# Patient Record
Sex: MALE | Race: WHITE | NOT HISPANIC OR LATINO | Employment: OTHER | URBAN - METROPOLITAN AREA
[De-identification: names, ages, dates, MRNs, and addresses within clinical notes are randomized per-mention and may not be internally consistent; named-entity substitution may affect disease eponyms.]

---

## 2017-03-09 ENCOUNTER — OFFICE VISIT (OUTPATIENT)
Dept: CARDIOLOGY | Facility: MEDICAL CENTER | Age: 82
End: 2017-03-09
Payer: MEDICARE

## 2017-03-09 ENCOUNTER — TELEPHONE (OUTPATIENT)
Dept: CARDIOLOGY | Facility: MEDICAL CENTER | Age: 82
End: 2017-03-09

## 2017-03-09 VITALS
BODY MASS INDEX: 26.21 KG/M2 | HEIGHT: 67 IN | WEIGHT: 167 LBS | SYSTOLIC BLOOD PRESSURE: 110 MMHG | HEART RATE: 74 BPM | OXYGEN SATURATION: 90 % | DIASTOLIC BLOOD PRESSURE: 70 MMHG

## 2017-03-09 DIAGNOSIS — I49.5 SICK SINUS SYNDROME (HCC): ICD-10-CM

## 2017-03-09 DIAGNOSIS — Z95.0 CARDIAC PACEMAKER IN SITU: ICD-10-CM

## 2017-03-09 DIAGNOSIS — S06.5XAA SUBDURAL HEMATOMA (HCC): ICD-10-CM

## 2017-03-09 PROCEDURE — 99214 OFFICE O/P EST MOD 30 MIN: CPT | Performed by: INTERNAL MEDICINE

## 2017-03-09 PROCEDURE — G8432 DEP SCR NOT DOC, RNG: HCPCS | Performed by: INTERNAL MEDICINE

## 2017-03-09 PROCEDURE — 1036F TOBACCO NON-USER: CPT | Performed by: INTERNAL MEDICINE

## 2017-03-09 PROCEDURE — 4040F PNEUMOC VAC/ADMIN/RCVD: CPT | Mod: 8P | Performed by: INTERNAL MEDICINE

## 2017-03-09 PROCEDURE — G8420 CALC BMI NORM PARAMETERS: HCPCS | Performed by: INTERNAL MEDICINE

## 2017-03-09 PROCEDURE — G8484 FLU IMMUNIZE NO ADMIN: HCPCS | Performed by: INTERNAL MEDICINE

## 2017-03-09 PROCEDURE — 1101F PT FALLS ASSESS-DOCD LE1/YR: CPT | Mod: 8P | Performed by: INTERNAL MEDICINE

## 2017-03-09 RX ORDER — FLUTICASONE PROPIONATE 50 MCG
1 SPRAY, SUSPENSION (ML) NASAL DAILY
COMMUNITY
End: 2019-01-01

## 2017-03-09 NOTE — MR AVS SNAPSHOT
"Gonzales De La Cruz   3/9/2017 2:45 PM   Office Visit   MRN: [id number]    Department:  [facility]   Dept Phone:  [phone]    Description:  Male : 1922   Provider:  Jad Martínez M.D.           Reason for Visit     Follow-Up           Allergies as of 3/9/2017     Allergen Noted Reactions    Nkda [No Known Drug Allergy] 2009         You were diagnosed with     Sick sinus syndrome (CMS-HCC)   [473831]       Cardiac pacemaker in situ   [V45.01.ICD-9-CM]       Subdural hematoma (CMS-HCC)   [440219]         Vital Signs     Blood Pressure Pulse Height Weight Body Mass Index Oxygen Saturation    110/70 mmHg 74 1.702 m (5' 7\") 75.751 kg (167 lb) 26.15 kg/m2 90%    Smoking Status                   Former Smoker           Basic Information     Date Of Birth Sex Race Ethnicity Preferred Language    1922 Male White Non- English      Problem List              ICD-10-CM Priority Class Noted - Resolved    Cardiac pacemaker in situ Z95.0   2015 - Present    Dyslipidemia E78.5   2016 - Present    Subdural hematoma (CMS-HCC) I62.00   2016 - Present    Swollen face R22.0   2016 - Present    Sick sinus syndrome (CMS-[facility]) I49.5   3/9/2017 - Present      Health Maintenance        Date Due Completion Dates    IMM DTaP/Tdap/Td Vaccine (1 - Tdap) 1941 ---    COLONOSCOPY 1972 ---    IMM ZOSTER VACCINE 1982 ---    IMM PNEUMOCOCCAL 65+ (ADULT) LOW/MEDIUM RISK SERIES (1 of 2 - PCV13) 1987 ---    IMM INFLUENZA (1) 2016 ---            Current Immunizations     No immunizations on file.      Below and/or attached are the medications your provider expects you to take. Review all of your home medications and newly ordered medications with your provider and/or pharmacist. Follow medication instructions as directed by your provider and/or pharmacist. Please keep your medication list with you and share with your provider. Update the information when medications are " discontinued, doses are changed, or new medications (including over-the-counter products) are added; and carry medication information at all times in the event of emergency situations     Allergies:  NKDA - (reactions not documented)               Medications  Valid as of: March 09, 2017 -  3:45 PM    Generic Name Brand Name Tablet Size Instructions for use    Acetaminophen (Tab) Acetaminophen 650 MG Take  by mouth.        Aspirin (Tablet Delayed Response) ECOTRIN 81 MG Take 81 mg by mouth every day.        Bisacodyl (Suppos) DULCOLAX 10 MG Insert 10 mg in rectum every day.        Brimonidine Tartrate-Timolol   by Ophthalmic route.        Calcium Carbonate Antacid   Take  by mouth.        Cholecalciferol (Tab) cholecalciferol 1000 UNIT Take 2,000 Units by mouth every day.        Docusate Sodium (Cap) COLACE 100 MG Take 1 Cap by mouth every day.        Fluticasone Propionate   Spray  in nose.        Fluticasone Propionate (Suspension) FLONASE 50 MCG/ACT Spray 1 Spray in nose every day.        Hydrocodone-Acetaminophen (Tab) NORCO 5-325 MG Take 1 Tab by mouth at bedtime as needed.        Latanoprost (Solution) XALATAN 0.005 % Place 1 Drop in both eyes every evening.        Magnesium Hydroxide   Take  by mouth.        Omeprazole (CAPSULE DELAYED RELEASE) PRILOSEC 20 MG Take 20 mg by mouth every day.        Polyethylene Glycol 3350 (Powder) MIRALAX  Take 17 g by mouth every evening.        Saline (Solution) OCEAN 0.65 % Spray 1 Spray in nose as needed for Congestion.        Simethicone (Chew Tab) MYLICON 80 MG Take 80 mg by mouth every 6 hours as needed for Flatulence.        Sodium Phosphates (Enema) fleet 7-19 GM/118ML Insert 133 mL in rectum 1 time daily as needed.        Tamsulosin HCl (Cap) FLOMAX 0.4 MG Take 0.4 mg by mouth ONE-HALF HOUR AFTER BREAKFAST.        .                 Medicines prescribed today were sent to:     None      Medication refill instructions:       If your prescription bottle indicates you  have medication refills left, it is not necessary to call your provider’s office. Please contact your pharmacy and they will refill your medication.    If your prescription bottle indicates you do not have any refills left, you may request refills at any time through one of the following ways: The online [employer] system (except Urgent Care), by calling your provider’s office, or by asking your pharmacy to contact your provider’s office with a refill request. Medication refills are processed only during regular business hours and may not be available until the next business day. Your provider may request additional information or to have a follow-up visit with you prior to refilling your medication.   *Please Note: Medication refills are assigned a new Rx number when refilled electronically. Your pharmacy may indicate that no refills were authorized even though a new prescription for the same medication is available at the pharmacy. Please request the medicine by name with the pharmacy before contacting your provider for a refill.        Your To Do List     Future Labs/Procedures Complete By [employer]    BASIC METABOLIC PANEL  As directed 3/9/2018         [facility] [facility] Access Code: [email or web address]  Expires: 4/8/2017  3:45 PM    [facility]  A secure, online tool to manage your health information     [employer]® is a secure, online tool that connects you to your personalized health information from the privacy of your home -- day or night - making it very easy for you to manage your healthcare. Once the activation process is completed, you can even access your medical information using the [employer] yan, which is available for free in the Apple Yan store or Google Play store.     [facility] provides the following levels of access (as shown below):   My Chart Features   [facility] Primary [facility] Doctor [facility]  Specialists [facility]  Urgent  Care Non-[facility]  Primary Care  Doctor   Email your healthcare team securely and privately  24/7 X X X    Manage appointments: schedule your next appointment; view details of past/upcoming appointments X      Request prescription refills. X      View recent personal medical records, including lab and immunizations X X X X   View health record, including health history, allergies, medications X X X X   Read reports about your outpatient visits, procedures, consult and ER notes X X X X   See your discharge summary, which is a recap of your hospital and/or ER visit that includes your diagnosis, lab results, and care plan. X X       How to register for [facility]:  1. Go to  [email or web address].  2. Click on the Sign Up Now box, which takes you to the New Member Sign Up page. You will need to provide the following information:  a. Enter your [facility] Access Code exactly as it appears at the top of this page. (You will not need to use this code after you’ve completed the sign-up process. If you do not sign up before the expiration date, you must request a new code.)   b. Enter your date of birth.   c. Enter your home email address.   d. Click Submit, and follow the next screen’s instructions.  3. Create a [facility] ID. This will be your [facility] login ID and cannot be changed, so think of one that is secure and easy to remember.  4. Create a [facility] password. You can change your password at any time.  5. Enter your Password Reset Question and Answer. This can be used at a later time if you forget your password.   6. Enter your e-mail address. This allows you to receive e-mail notifications when new information is available in [facility].  7. Click Sign Up. You can now view your health information.    For assistance activating your [facility] account, call [phone]

## 2017-03-09 NOTE — Clinical Note
Name:          Gonzales De La Cruz   YOB: 1922  Date:     3/9/2017      Joseluis Funes M.D.  [address]  [place] [address]     Jad Martínez MD  [address], [address]  [place] [address]  Phone: [phone]  Back Line: [phone]  Fax: [phone]  E-mail: [email or web address]   Dear Dr. Funes,    We had the pleasure of seeing your patient, Gonzales De La Cruz, in Cardiology Clinic at [facility] today.    As you know, he is a 89-year-old man with a history of sinus node dysfunction status post pacemaker implantation that we have followed him for in the past.  He is frail, hard of hearing and somewhat demented, and he has had a prior traumatic subdural hematoma.    He has no cardiovascular complaints today though history I think again is unreliable. He comes in with no staff from [facility], which seems fairly unacceptable given that he does have some dementia, and does not seem to grasp clearly what is going on with him. I had previously attempted to contact family unsuccessfully. At this point, his blood pressure is near normal on only Flomax for his BPH at 110/70 mmHg, and he continues aspirin in light of his prior subdural hematoma and no mode switching on prior device interrogations. His carotid ultrasound showed no significant disease, and I do not think that Plavix will be beneficial to him. I did order repeat blood tests including a CBC, chemistry panel, and lipid panel.    I certainly think that he does not have a good understanding of his medical condition and the test being ordered. It would in my opinion behoove him to have a DPOA though I will defer that to his primary care physician.    We will have the patient follow-up in 6 months.    Thank you for the referral and please do not hesitate to contact me at any time. My contact information is listed above.    This note was dictated using Dragon speech recognition software.     A full note including my physical  examination and a full list of rectified medications is available in our medical record, and can be faxed as well.    Jad Martínez MD  Cardiologist  [facility]

## 2017-03-10 NOTE — PROGRESS NOTES
Subjective:   Gonzales De La Cruz is a 89-year-old man with a history of sinus node dysfunction status post pacemaker implantation that we have followed him for in the past.  He is frail, hard of hearing and somewhat demented, and he has had a prior traumatic subdural hematoma.    He comes in today for routine follow-up and has no complaints as usual. Again his usual it was not possible to obtain a history area  previously attempted to call the patient's daughter and was met with essentially no success. Today, we called his facility, [facility]. We had attempted to get in touch with the patient's new primary care physician reflected in the medical record. That person apparently was unavailable, and we spoke with the unit manager at [phone]. Her name is Daniel. She did relate to me the results of his carotid ultrasound that I had ordered at our last appointment, which were not forwarded to us. He had no significant carotid disease.    Past Medical History   Diagnosis Date   • Weakness    • Difficulty in walking(719.7)    • Symbolic dysfunction    • Dysphagia    • Head injury    • Brain injury (CMS-HCC)    • Bradycardia      requiring pacelmaker   • [place] (hard of hearing)      will not wear hearing aides   • Constipation    • Vancomycin resistant enterococcus culture positive    • Measles    • Glaucoma    • Arrhythmia    • Chronic atrial fibrillation (CMS-HCC)      rapid ventricular rate   • Pneumonia    • Arthritis    • Cardiac pacemaker in situ 2015   • Dyslipidemia 2016   • Subdural hematoma (CMS-HCC) 2016   • Subdural hematoma (CMS-HCC) 2016     Past Surgical History   Procedure Laterality Date   • Hernia rep hiatal     • Other abdominal surgery       History reviewed. No pertinent family history.  History   Smoking status   • Former Smoker   Smokeless tobacco   • Never Used     Allergies   Allergen Reactions   • Nkda [No Known Drug Allergy]      Outpatient Encounter Prescriptions as of  "3/9/2017   Medication Sig Dispense Refill   • fluticasone (FLONASE) 50 MCG/ACT nasal spray Spray 1 Spray in nose every day.     • Magnesium Hydroxide (MILK OF MAGNESIA PO) Take  by mouth.     • aspirin EC (ECOTRIN) 81 MG Tablet Delayed Response Take 81 mg by mouth every day.     • sodium chloride (OCEAN) 0.65 % Solution Spray 1 Spray in nose as needed for Congestion.     • Brimonidine Tartrate-Timolol (COMBIGAN OP) by Ophthalmic route.     • Fluticasone Propionate (FLONASE NA) Spray  in nose.     • bisacodyl (DULCOLAX) 10 MG Suppos Insert 10 mg in rectum every day.     • Calcium Carbonate Antacid (TUMS E-X PO) Take  by mouth.     • hydrocodone-acetaminophen (NORCO) 5-325 MG Tab per tablet Take 1 Tab by mouth at bedtime as needed.     • Sodium Phosphates (FLEET) 7-19 GM/118ML Enema Insert 133 mL in rectum 1 time daily as needed.     • polyethylene glycol 3350 (MIRALAX) Powder Take 17 g by mouth every evening.     • docusate sodium (COLACE) 100 MG Cap Take 1 Cap by mouth every day.     • omeprazole (PRILOSEC) 20 MG CPDR Take 20 mg by mouth every day.     • vitamin D (CHOLECALCIFEROL) 1000 UNIT TABS Take 2,000 Units by mouth every day.     • tamsulosin (FLOMAX) 0.4 MG capsule Take 0.4 mg by mouth ONE-HALF HOUR AFTER BREAKFAST.     • latanoprost (XALATAN) 0.005 % SOLN Place 1 Drop in both eyes every evening.     • Acetaminophen 650 MG TABS Take  by mouth.     • simethicone (MYLICON) 80 MG Chew Tab Take 80 mg by mouth every 6 hours as needed for Flatulence.       No facility-administered encounter medications on file as of 3/9/2017.     Review of Systems   Unable to perform ROS: mental acuity        Objective:   /70 mmHg  Pulse 74  Ht 1.702 m (5' 7\")  Wt 75.751 kg (167 lb)  BMI 26.15 kg/m2  SpO2 90%    Physical Exam   Constitutional: He appears well-developed and well-nourished. No distress.   Frail, elderly man not accompanied by friends or family to our visit today somewhat confused but in no distress "   HENT:   Head: Normocephalic.   Part of hearing. Previous left temporal scar from his traumatic subdural hematoma noted.   Eyes: Conjunctivae and EOM are normal. Pupils are equal, round, and reactive to light. No scleral icterus.   Neck: Neck supple. No JVD present. No tracheal deviation present.   Cardiovascular: Normal rate, regular rhythm, normal heart sounds and intact distal pulses.  Exam reveals no gallop and no friction rub.    No murmur heard.  Pulses:       Dorsalis pedis pulses are 2+ on the right side, and 2+ on the left side.   No carotid bruits; Pacemaker generator noted with a well-healed old scar and no surrounding erythema nor induration   Pulmonary/Chest: Effort normal and breath sounds normal. No stridor. No respiratory distress. He has no wheezes. He has no rales.   Abdominal: Soft. Bowel sounds are normal. He exhibits no distension. A hernia is present.   Musculoskeletal: He exhibits edema (trace bilateral).   Neurological:   Exam deferred   Skin: Skin is warm and dry. No rash noted. He is not diaphoretic. No erythema. No pallor.   Psychiatric: He has a normal mood and affect.   Vitals reviewed.      Assessment:     1. Sick sinus syndrome (CMS-HCC)  LIPID PANEL    BASIC METABOLIC PANEL    CBC WITH DIFFERENTIAL   2. Cardiac pacemaker in situ     3. Subdural hematoma (CMS-HCC)         Medical Decision Making:  Today's Assessment / Status / Plan:     Medical Decision Making:    He has no cardiovascular complaints today though history I think again is unreliable. He comes in with no staff from [facility], which seems fairly unacceptable given that he does have some dementia, and does not seem to grasp clearly what is going on with him. I had previously attempted to contact family unsuccessfully. At this point, his blood pressure is near normal on only Flomax for his BPH at 110/70 mmHg, and he continues aspirin in light of his prior subdural hematoma and no mode switching on prior device  interrogations. His carotid ultrasound showed no significant disease, and I do not think that Plavix will be beneficial to him. I did order repeat blood tests including a CBC, chemistry panel, and lipid panel.    I certainly think that he does not have a good understanding of his medical condition and the test being ordered. It would in my opinion behoove him to have a DPOA though I will defer that to his primary care physician.    Jad Martínez MD  Cardiologist, [facility] [place]

## 2017-03-10 NOTE — PROGRESS NOTES
Name:          Gonzales De La Cruz   YOB: 1922  Date:     3/9/2017      Joseluis Funes M.D.  [address]  [facility] [address]     Jad Martínez MD  [address], [address]  [place] [address]  Phone: [phone]  Back Line: [phone]  Fax: [phone]  E-mail: [email or web address]   Dear Dr. Funes,    We had the pleasure of seeing your patient, Gonzales De La Cruz, in Cardiology Clinic at [facility] today.    As you know, he is a 89-year-old man with a history of sinus node dysfunction status post pacemaker implantation that we have followed him for in the past.  He is frail, hard of hearing and somewhat demented, and he has had a prior traumatic subdural hematoma.    He has no cardiovascular complaints today though history I think again is unreliable. He comes in with no staff from [facility], which seems fairly unacceptable given that he does have some dementia, and does not seem to grasp clearly what is going on with him. I had previously attempted to contact family unsuccessfully. At this point, his blood pressure is near normal on only Flomax for his BPH at 110/70 mmHg, and he continues aspirin in light of his prior subdural hematoma and no mode switching on prior device interrogations. His carotid ultrasound showed no significant disease, and I do not think that Plavix will be beneficial to him. I did order repeat blood tests including a CBC, chemistry panel, and lipid panel.    I certainly think that he does not have a good understanding of his medical condition and the test being ordered. It would in my opinion behoove him to have a DPOA though I will defer that to his primary care physician.    We will have the patient follow-up in 6 months.    Thank you for the referral and please do not hesitate to contact me at any time. My contact information is listed above.    This note was dictated using Dragon speech recognition software.     A full note including my physical  examination and a full list of rectified medications is available in our medical record, and can be faxed as well.    Jad Martínez MD  Cardiologist  [facility]

## 2017-03-16 ENCOUNTER — TELEPHONE (OUTPATIENT)
Dept: CARDIOLOGY | Facility: MEDICAL CENTER | Age: 82
End: 2017-03-16

## 2017-03-16 NOTE — TELEPHONE ENCOUNTER
----- Message from Shelby Child sent at 3/16/2017 11:38 AM PDT -----  Regarding: did you receive pt's labs?  Contact: [phone]  IA/santo Sanchez calling from [facility] to ask if you received recent labs. Please call to advise, [phone].

## 2017-03-16 NOTE — TELEPHONE ENCOUNTER
Lab results from 3/15/2017 are in media. Called Daniel at [facility] and advised her of the same.    Forwarded to Dr. Jad Martínez for fyi.    LUCERO AMIN

## 2017-09-05 ENCOUNTER — OFFICE VISIT (OUTPATIENT)
Dept: CARDIOLOGY | Facility: MEDICAL CENTER | Age: 82
End: 2017-09-05
Payer: MEDICARE

## 2017-09-05 ENCOUNTER — NON-PROVIDER VISIT (OUTPATIENT)
Dept: CARDIOLOGY | Facility: MEDICAL CENTER | Age: 82
End: 2017-09-05
Payer: MEDICARE

## 2017-09-05 VITALS
HEIGHT: 67 IN | HEART RATE: 62 BPM | SYSTOLIC BLOOD PRESSURE: 100 MMHG | OXYGEN SATURATION: 90 % | DIASTOLIC BLOOD PRESSURE: 60 MMHG

## 2017-09-05 DIAGNOSIS — Z86.79 HISTORY OF SUBDURAL HEMATOMA: ICD-10-CM

## 2017-09-05 DIAGNOSIS — I49.5 SICK SINUS SYNDROME (HCC): ICD-10-CM

## 2017-09-05 DIAGNOSIS — E78.5 DYSLIPIDEMIA: ICD-10-CM

## 2017-09-05 DIAGNOSIS — Z95.0 CARDIAC PACEMAKER IN SITU: Primary | ICD-10-CM

## 2017-09-05 PROCEDURE — 99214 OFFICE O/P EST MOD 30 MIN: CPT | Performed by: INTERNAL MEDICINE

## 2017-09-05 PROCEDURE — 93280 PM DEVICE PROGR EVAL DUAL: CPT | Performed by: INTERNAL MEDICINE

## 2017-09-05 RX ORDER — INSULIN GLARGINE 100 [IU]/ML
16 INJECTION, SOLUTION SUBCUTANEOUS NIGHTLY
COMMUNITY
End: 2018-09-26

## 2017-09-05 NOTE — PROGRESS NOTES
Subjective:   Gonzales De La Cruz is a 89-year-old man with a history of sinus node dysfunction status post pacemaker implantation that we have followed him for in the past.  He is frail, hard of hearing and somewhat demented, and he has had a prior traumatic subdural hematoma.    He has no cardiovascular complaints today, but does relate some cough and nasal sinus drainage.    I again attempted to contact his guardian who is his niece. Her name is Olivia Johnson. I called her cell phone listed at [phone]. That number was not receiving calls. I then called her home phone number at [phone]. She lives in [facility]. He also has listed on his contact information his sister Lou whose number is [phone].    Past Medical History:   Diagnosis Date   • Arrhythmia    • Arthritis    • Bradycardia     requiring pacelmaker   • Brain injury (CMS-[facility])    • Cardiac pacemaker in situ 5/8/2015   • Chronic atrial fibrillation (CMS-[facility])     rapid ventricular rate   • Constipation    • Difficulty in walking    • Dyslipidemia 4/20/2016   • Dysphagia    • Glaucoma    • Head injury    • [place] (hard of hearing)     will not wear hearing aides   • Measles    • Pneumonia    • Subdural hematoma (CMS-[facility]) 4/20/2016   • Subdural hematoma (CMS-[facility]) 4/20/2016   • Symbolic dysfunction    • Vancomycin resistant enterococcus culture positive    • Weakness      Past Surgical History:   Procedure Laterality Date   • HERNIA REP HIATAL     • OTHER ABDOMINAL SURGERY       No family history on file.  History   Smoking Status   • Former Smoker   Smokeless Tobacco   • Never Used     Allergies   Allergen Reactions   • Nkda [No Known Drug Allergy]      Outpatient Encounter Prescriptions as of 9/5/2017   Medication Sig Dispense Refill   • insulin glargine (LANTUS) 100 UNIT/ML Solution Inject 16 Units as instructed every evening.     • insulin lispro (HUMALOG) 100 UNIT/ML Solution Inject  as instructed.     • fluticasone (FLONASE) 50 MCG/ACT nasal  "spray Spray 1 Spray in nose every day.     • aspirin EC (ECOTRIN) 81 MG Tablet Delayed Response Take 81 mg by mouth every day.     • sodium chloride (OCEAN) 0.65 % Solution Spray 1 Spray in nose as needed for Congestion.     • Brimonidine Tartrate-Timolol (COMBIGAN OP) by Ophthalmic route.     • Fluticasone Propionate (FLONASE NA) Spray  in nose.     • hydrocodone-acetaminophen (NORCO) 5-325 MG Tab per tablet Take 1 Tab by mouth at bedtime as needed.     • polyethylene glycol 3350 (MIRALAX) Powder Take 17 g by mouth every evening.     • docusate sodium (COLACE) 100 MG Cap Take 1 Cap by mouth every day.     • omeprazole (PRILOSEC) 20 MG CPDR Take 20 mg by mouth every day.     • vitamin D (CHOLECALCIFEROL) 1000 UNIT TABS Take 2,000 Units by mouth every day.     • tamsulosin (FLOMAX) 0.4 MG capsule Take 0.4 mg by mouth ONE-HALF HOUR AFTER BREAKFAST.     • latanoprost (XALATAN) 0.005 % SOLN Place 1 Drop in both eyes every evening.     • Acetaminophen 650 MG TABS Take  by mouth.     • Magnesium Hydroxide (MILK OF MAGNESIA PO) Take  by mouth.     • bisacodyl (DULCOLAX) 10 MG Suppos Insert 10 mg in rectum every day.     • Calcium Carbonate Antacid (TUMS E-X PO) Take  by mouth.     • simethicone (MYLICON) 80 MG Chew Tab Take 80 mg by mouth every 6 hours as needed for Flatulence.     • Sodium Phosphates (FLEET) 7-19 GM/118ML Enema Insert 133 mL in rectum 1 time daily as needed.       No facility-administered encounter medications on file as of 9/5/2017.      Review of Systems   Unable to perform ROS: mental acuity        Objective:   /60   Pulse 62   Ht 1.702 m (5' 7\")   SpO2 90%     Physical Exam   Constitutional: He appears well-developed and well-nourished. No distress.   Frail, elderly man not accompanied by friends or family to our visit today somewhat confused but in no distress. He again is quite hard of hearing, but today accompanied by his  from [facility] care.   HENT:   Head: Normocephalic.   Hard of " hearing. Previous left temporal scar from his traumatic subdural hematoma noted.   Eyes: Conjunctivae and EOM are normal. Pupils are equal, round, and reactive to light. No scleral icterus.   Neck: Neck supple. No JVD present. No tracheal deviation present.   Cardiovascular: Normal rate, regular rhythm, normal heart sounds and intact distal pulses.  Exam reveals no gallop and no friction rub.    No murmur heard.  Pulses:       Dorsalis pedis pulses are 2+ on the right side, and 2+ on the left side.   No carotid bruits; Pacemaker generator noted with a well-healed old scar and no surrounding erythema nor induration   Pulmonary/Chest: Effort normal and breath sounds normal. No stridor. No respiratory distress. He has no wheezes. He has no rales.   Abdominal: Soft. Bowel sounds are normal. He exhibits no distension. A hernia is present.   Musculoskeletal: He exhibits edema (trace bilateral lower extremity edema).   Neurological:   Exam deferred   Skin: Skin is warm and dry. No rash noted. He is not diaphoretic. No erythema. No pallor.   Psychiatric: He has a normal mood and affect.   Vitals reviewed.    Labs, 3/15/2017  Lipids: LDL 97, HDL 28, triglycerides 373, total cholesterol 200  CBC: WBC 7.3, hemoglobin 16.8, platelets 174  Chemistry panel: Sodium 142, potassium 4.3, chloride 102, CO2 29, BUN 23, creatinine 1.0, glucose 372, calcium 9.8  LFTs: AST 9.7, ALT 6.2, alkaline phosphatase 102, albumin 4.3, total protein 7.5, total bilirubin 0.6    Assessment:     1. Cardiac pacemaker in situ     2. Dyslipidemia     3. History of subdural hematoma     4. Sick sinus syndrome (CMS-HCC)         Medical Decision Making:  Today's Assessment / Status / Plan:     He again has no cardiovascular complaints, and good control of his blood pressure. I reviewed with him his labs from March with reasonable control of his lipids. His blood sugars were a bit elevated, but there is no other abnormality on his lab tests that I was  concerned about. His device check again showed no mode switching and good device function. I made no changes to his medical regimen.    I continue to feel that he needs a DURABLE POWER OF . I reached out again to his niece who I understand is in charge of his care. She did not answer her phone, and at the time of this note has not returned my calls.    Jad Martínez MD  Cardiologist, [facility] [place]     Return to clinic in one year

## 2017-09-05 NOTE — LETTER
[facility]   [address]  [place] [address]  Phone: [phone]  Fax: [phone]              Gonzales De La Cruz  9/30/1922    Encounter Date: 9/5/2017    Jad Martínez M.D.          PROGRESS NOTE:  Subjective:   Gonzales De La Cruz is a 89-year-old man with a history of sinus node dysfunction status post pacemaker implantation that we have followed him for in the past.  He is frail, hard of hearing and somewhat demented, and he has had a prior traumatic subdural hematoma.    He has no cardiovascular complaints today, but does relate some cough and nasal sinus drainage.    I again attempted to contact his guardian who is his niece. Her name is Olivia Johnson. I called her cell phone listed at [phone]. That number was not receiving calls. I then called her home phone number at [phone]. She lives in [facility]. He also has listed on his contact information his sister Lou whose number is [phone].    Past Medical History:   Diagnosis Date   • Arrhythmia    • Arthritis    • Bradycardia     requiring pacelmaker   • Brain injury (CMS-HCC)    • Cardiac pacemaker in situ 5/8/2015   • Chronic atrial fibrillation (CMS-[facility])     rapid ventricular rate   • Constipation    • Difficulty in walking    • Dyslipidemia 4/20/2016   • Dysphagia    • Glaucoma    • Head injury    • [place] (hard of hearing)     will not wear hearing aides   • Measles    • Pneumonia    • Subdural hematoma (CMS-HCC) 4/20/2016   • Subdural hematoma (CMS-[facility]) 4/20/2016   • Symbolic dysfunction    • Vancomycin resistant enterococcus culture positive    • Weakness      Past Surgical History:   Procedure Laterality Date   • HERNIA REP HIATAL     • OTHER ABDOMINAL SURGERY       No family history on file.  History   Smoking Status   • Former Smoker   Smokeless Tobacco   • Never Used     Allergies   Allergen Reactions   • Nkda [No Known Drug Allergy]      Outpatient Encounter Prescriptions  "as of 9/5/2017   Medication Sig Dispense Refill   • insulin glargine (LANTUS) 100 UNIT/ML Solution Inject 16 Units as instructed every evening.     • insulin lispro (HUMALOG) 100 UNIT/ML Solution Inject  as instructed.     • fluticasone (FLONASE) 50 MCG/ACT nasal spray Spray 1 Spray in nose every day.     • aspirin EC (ECOTRIN) 81 MG Tablet Delayed Response Take 81 mg by mouth every day.     • sodium chloride (OCEAN) 0.65 % Solution Spray 1 Spray in nose as needed for Congestion.     • Brimonidine Tartrate-Timolol (COMBIGAN OP) by Ophthalmic route.     • Fluticasone Propionate (FLONASE NA) Spray  in nose.     • hydrocodone-acetaminophen (NORCO) 5-325 MG Tab per tablet Take 1 Tab by mouth at bedtime as needed.     • polyethylene glycol 3350 (MIRALAX) Powder Take 17 g by mouth every evening.     • docusate sodium (COLACE) 100 MG Cap Take 1 Cap by mouth every day.     • omeprazole (PRILOSEC) 20 MG CPDR Take 20 mg by mouth every day.     • vitamin D (CHOLECALCIFEROL) 1000 UNIT TABS Take 2,000 Units by mouth every day.     • tamsulosin (FLOMAX) 0.4 MG capsule Take 0.4 mg by mouth ONE-HALF HOUR AFTER BREAKFAST.     • latanoprost (XALATAN) 0.005 % SOLN Place 1 Drop in both eyes every evening.     • Acetaminophen 650 MG TABS Take  by mouth.     • Magnesium Hydroxide (MILK OF MAGNESIA PO) Take  by mouth.     • bisacodyl (DULCOLAX) 10 MG Suppos Insert 10 mg in rectum every day.     • Calcium Carbonate Antacid (TUMS E-X PO) Take  by mouth.     • simethicone (MYLICON) 80 MG Chew Tab Take 80 mg by mouth every 6 hours as needed for Flatulence.     • Sodium Phosphates (FLEET) 7-19 GM/118ML Enema Insert 133 mL in rectum 1 time daily as needed.       No facility-administered encounter medications on file as of 9/5/2017.      Review of Systems   Unable to perform ROS: mental acuity        Objective:   /60   Pulse 62   Ht 1.702 m (5' 7\")   SpO2 90%     Physical Exam   Constitutional: He appears well-developed and " well-nourished. No distress.   Frail, elderly man not accompanied by friends or family to our visit today somewhat confused but in no distress. He again is quite hard of hearing, but today accompanied by his  from [facility] care.   HENT:   Head: Normocephalic.   Hard of hearing. Previous left temporal scar from his traumatic subdural hematoma noted.   Eyes: Conjunctivae and EOM are normal. Pupils are equal, round, and reactive to light. No scleral icterus.   Neck: Neck supple. No JVD present. No tracheal deviation present.   Cardiovascular: Normal rate, regular rhythm, normal heart sounds and intact distal pulses.  Exam reveals no gallop and no friction rub.    No murmur heard.  Pulses:       Dorsalis pedis pulses are 2+ on the right side, and 2+ on the left side.   No carotid bruits; Pacemaker generator noted with a well-healed old scar and no surrounding erythema nor induration   Pulmonary/Chest: Effort normal and breath sounds normal. No stridor. No respiratory distress. He has no wheezes. He has no rales.   Abdominal: Soft. Bowel sounds are normal. He exhibits no distension. A hernia is present.   Musculoskeletal: He exhibits edema (trace bilateral lower extremity edema).   Neurological:   Exam deferred   Skin: Skin is warm and dry. No rash noted. He is not diaphoretic. No erythema. No pallor.   Psychiatric: He has a normal mood and affect.   Vitals reviewed.    Labs, 3/15/2017  Lipids: LDL 97, HDL 28, triglycerides 373, total cholesterol 200  CBC: WBC 7.3, hemoglobin 16.8, platelets 174  Chemistry panel: Sodium 142, potassium 4.3, chloride 102, CO2 29, BUN 23, creatinine 1.0, glucose 372, calcium 9.8  LFTs: AST 9.7, ALT 6.2, alkaline phosphatase 102, albumin 4.3, total protein 7.5, total bilirubin 0.6    Assessment:     1. Cardiac pacemaker in situ     2. Dyslipidemia     3. History of subdural hematoma     4. Sick sinus syndrome (CMS-HCC)         Medical Decision Making:  Today's Assessment / Status / Plan:        He again has no cardiovascular complaints, and good control of his blood pressure. I reviewed with him his labs from March with reasonable control of his lipids. His blood sugars were a bit elevated, but there is no other abnormality on his lab tests that I was concerned about. His device check again showed no mode switching and good device function. I made no changes to his medical regimen.    I continue to feel that he needs a DURABLE POWER OF . I reached out again to his niece who I understand is in charge of his care. She did not answer her phone, and at the time of this note has not returned my calls.    Jad Marítnez MD  Cardiologist, [facility] [place]     Return to clinic in one year      Joseluis Funes M.D.  [address]  [address]  [place] [address]  VIA Mail

## 2018-03-07 ENCOUNTER — NON-PROVIDER VISIT (OUTPATIENT)
Dept: CARDIOLOGY | Facility: MEDICAL CENTER | Age: 83
End: 2018-03-07
Payer: MEDICARE

## 2018-03-07 ENCOUNTER — OFFICE VISIT (OUTPATIENT)
Dept: CARDIOLOGY | Facility: MEDICAL CENTER | Age: 83
End: 2018-03-07
Payer: MEDICARE

## 2018-03-07 VITALS
HEIGHT: 67 IN | SYSTOLIC BLOOD PRESSURE: 100 MMHG | DIASTOLIC BLOOD PRESSURE: 60 MMHG | HEART RATE: 70 BPM | OXYGEN SATURATION: 93 %

## 2018-03-07 DIAGNOSIS — E78.5 DYSLIPIDEMIA: ICD-10-CM

## 2018-03-07 DIAGNOSIS — I49.5 SICK SINUS SYNDROME (HCC): ICD-10-CM

## 2018-03-07 DIAGNOSIS — Z95.0 CARDIAC PACEMAKER IN SITU: Primary | ICD-10-CM

## 2018-03-07 DIAGNOSIS — Z51.5 END OF LIFE CARE: ICD-10-CM

## 2018-03-07 DIAGNOSIS — Z95.0 CARDIAC PACEMAKER IN SITU: ICD-10-CM
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
## 2018-03-07 DIAGNOSIS — Z86.79 HISTORY OF SUBDURAL HEMATOMA: ICD-10-CM

## 2018-03-07 PROCEDURE — 99214 OFFICE O/P EST MOD 30 MIN: CPT | Mod: 25 | Performed by: INTERNAL MEDICINE

## 2018-03-07 PROCEDURE — 93280 PM DEVICE PROGR EVAL DUAL: CPT | Performed by: INTERNAL MEDICINE

## 2018-03-07 ASSESSMENT — ENCOUNTER SYMPTOMS
CARDIOVASCULAR NEGATIVE: 1
FALLS: 1

## 2018-03-07 NOTE — PROGRESS NOTES
Subjective:   Gonzales De La Cruz is a 89 -year-old man with a history of sinus node dysfunction status post pacemaker implantation that we have followed him for in the past.  He is frail, hard of hearing and somewhat demented, and he has had a prior traumatic subdural hematoma.    He again has no cardiovascular complaints. 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 He seems to be tolerating his medical regimen well, and his device is functioning normally as usual. He continues on aspirin in light of his hemorrhagic stroke in the past.    We have after multiple previous attempts been unable to get in touch with his niece, and his other family members are . He comes in with his  from his living facility, [facility], as usual. The patient and his  R wonderfully pleasant as usual.    Past Medical History:   Diagnosis Date   • Arrhythmia    • Arthritis    • Bradycardia     requiring pacelmaker   • Brain injury (CMS-HCC)    • Cardiac pacemaker in situ 2015   • Chronic atrial fibrillation (CMS-[facility])     rapid ventricular rate   • Constipation    • Difficulty in walking(719.7)    • Dyslipidemia 2016   • Dysphagia    • Glaucoma    • Head injury    • [place] (hard of hearing)     will not wear hearing aides   • Measles    • Pneumonia    • Subdural hematoma (CMS-HCC) 2016   • Subdural hematoma (CMS-HCC) 2016   • Symbolic dysfunction    • Vancomycin resistant enterococcus culture positive    • Weakness      Past Surgical History:   Procedure Laterality Date   • HERNIA REP HIATAL     • OTHER ABDOMINAL SURGERY       No family history on file.  History   Smoking Status   • Former Smoker   Smokeless Tobacco   • Never Used     Allergies   Allergen Reactions   • Nkda [No Known Drug Allergy]      Outpatient Encounter Prescriptions as of 3/7/2018   Medication Sig Dispense Refill   • Insulin Aspart (NOVOLOG SC) Inject  as  instructed.     • insulin glargine (LANTUS) 100 UNIT/ML Solution Inject 16 Units as instructed every evening.     • fluticasone (FLONASE) 50 MCG/ACT nasal spray Spray 1 Spray in nose every day.     • aspirin EC (ECOTRIN) 81 MG Tablet Delayed Response Take 81 mg by mouth every day.     • sodium chloride (OCEAN) 0.65 % Solution Spray 1 Spray in nose as needed for Congestion.     • Brimonidine Tartrate-Timolol (COMBIGAN OP) by Ophthalmic route.     • Fluticasone Propionate (FLONASE NA) Spray  in nose.     • bisacodyl (DULCOLAX) 10 MG Suppos Insert 10 mg in rectum as needed.     • Calcium Carbonate Antacid (TUMS E-X PO) Take  by mouth as needed.     • simethicone (MYLICON) 80 MG Chew Tab Take 80 mg by mouth every 6 hours as needed for Flatulence.     • hydrocodone-acetaminophen (NORCO) 5-325 MG Tab per tablet Take 1 Tab by mouth at bedtime as needed.     • Sodium Phosphates (FLEET) 7-19 GM/118ML Enema Insert 133 mL in rectum 1 time daily as needed.     • polyethylene glycol 3350 (MIRALAX) Powder Take 17 g by mouth every evening.     • docusate sodium (COLACE) 100 MG Cap Take 1 Cap by mouth every day.     • omeprazole (PRILOSEC) 20 MG CPDR Take 20 mg by mouth every day.     • vitamin D (CHOLECALCIFEROL) 1000 UNIT TABS Take 2,000 Units by mouth every day.     • tamsulosin (FLOMAX) 0.4 MG capsule Take 0.4 mg by mouth ONE-HALF HOUR AFTER BREAKFAST.     • latanoprost (XALATAN) 0.005 % SOLN Place 1 Drop in both eyes every evening.     • Acetaminophen 650 MG TABS Take  by mouth.     • insulin lispro (HUMALOG) 100 UNIT/ML Solution Inject  as instructed.     • Magnesium Hydroxide (MILK OF MAGNESIA PO) Take  by mouth.       No facility-administered encounter medications on file as of 3/7/2018.      Review of Systems   Cardiovascular: Negative.    Musculoskeletal: Positive for falls and joint pain.   Neurological:        Chronic right-sided weakness as a result of prior hemorrhagic stroke   All other systems reviewed and are  "negative.       Objective:   /60   Pulse 70   Ht 1.702 m (5' 7\")   SpO2 93%     Physical Exam   Constitutional: He appears well-developed and well-nourished. No distress.   Frail, elderly man not accompanied by friends or family to our visit today somewhat confused but in no distress. He again is quite hard of hearing, but today accompanied by his  from [facility]   HENT:   Head: Normocephalic.   Hard of hearing. Previous left temporal scar from his traumatic subdural hematoma noted.   Eyes: Conjunctivae and EOM are normal. Pupils are equal, round, and reactive to light. No scleral icterus.   Neck: Neck supple. No JVD present. No tracheal deviation present.   Cardiovascular: Normal rate, regular rhythm, normal heart sounds and intact distal pulses.  Exam reveals no gallop and no friction rub.    No murmur heard.  Pulses:       Dorsalis pedis pulses are 2+ on the right side, and 2+ on the left side.   No carotid bruits; Pacemaker generator noted with a well-healed old scar and no surrounding erythema nor induration   Pulmonary/Chest: Effort normal and breath sounds normal. No stridor. No respiratory distress. He has no wheezes. He has no rales.   Abdominal: Soft. Bowel sounds are normal. He exhibits no distension. A hernia is present.   Musculoskeletal: He exhibits edema (trace bilateral lower extremity edema).   Neurological:   Exam deferred   Skin: Skin is warm and dry. No rash noted. He is not diaphoretic. No erythema. No pallor.   Psychiatric: He has a normal mood and affect.   Vitals reviewed.    Labs, 3/15/2017  Lipids: LDL 97, HDL 28, triglycerides 373, total cholesterol 200  CBC: WBC 7.3, hemoglobin 16.8, platelets 174  Chemistry panel: Sodium 142, potassium 4.3, chloride 102, CO2 29, BUN 23, creatinine 1.0, glucose 372, calcium 9.8  LFTs: AST 9.7, ALT 6.2, alkaline phosphatase 102, albumin 4.3, total protein 7.5, total bilirubin 0.6    Assessment:     1. Cardiac pacemaker in situ     2. " Dyslipidemia  LIPID PANEL   3. Sick sinus syndrome (CMS-HCC)  BASIC METABOLIC PANEL    CBC WITH DIFFERENTIAL   4. End of life care     5. Fall, subsequent encounter     6. History of subdural hematoma         Medical Decision Making:  Today's Assessment / Status / Plan:     He has no cardiovascular complaints. His blood pressure is 100/60 mmHg and he does have some falls from the description of his  who comes in with him today. However, he is on no other antihypertensive therapy except for his BPH medicines, which I do not think can safely be stopped with concern for the possibility of obstructive uropathy. He continues on aspirin with other oral anticoagulants contraindicated in the setting of his previous hemorrhagic stroke.    I have discussed with our office again contacting his niece to get a POLST on file in our medical system.    Jad Martínez MD  Cardiologist, [facility] and V[facility] [place]     Return in about 6 months (around 9/7/2018).

## 2018-03-07 NOTE — LETTER
Name:          Gonzales De La Cruz   YOB: 1922  Date:     3/7/2018      Joseluis Funes M.D.  [address]  [facility] [address]     Jad Martínez MD  [address], [address]  [place] [address]  Phone: [phone]  Back Line: [phone]  Fax: [phone]  E-mail: [email or web address]   Dear Dr. Funes,    We had the pleasure of seeing your patient, Gonzales De La Cruz, in Cardiology Clinic at [facility] today.    As you know, he is a 89-year-old man with a history of sinus node dysfunction status post pacemaker implantation that we have followed him for in the past.  He is frail, hard of hearing and somewhat demented, and he has had a prior traumatic subdural hematoma.    He has no cardiovascular complaints. His blood pressure is 100/60 mmHg and he does have some falls from the description of his  who comes in with him today. However, he is on no other antihypertensive therapy except for his BPH medicines, which I do not think can safely be stopped with concern for the possibility of obstructive uropathy. He continues on aspirin with other oral anticoagulants contraindicated in the setting of his previous hemorrhagic stroke.    I have discussed with our office again contacting his niece to get a POLST on file in our medical system.    Return in about 6 months (around 9/7/2018).    Thank you for the referral and please do not hesitate to contact me at any time. My contact information is listed above.    This note was dictated using Dragon speech recognition software.     A full note including my physical examination and a full list of rectified medications is available in our medical record, and can be faxed as well.    Jad Martínez MD  Cardiologist  [facility]

## 2018-09-18 ENCOUNTER — TELEPHONE (OUTPATIENT)
Dept: CARDIOLOGY | Facility: MEDICAL CENTER | Age: 83
End: 2018-09-18

## 2018-09-18 NOTE — TELEPHONE ENCOUNTER
Spoke to Demetria at Home care facility, faxed labs slip to [phone] she will have nurse get them done prior to office visit.

## 2018-09-20 ENCOUNTER — OFFICE VISIT (OUTPATIENT)
Dept: CARDIOLOGY | Facility: MEDICAL CENTER | Age: 83
End: 2018-09-20
Payer: MEDICARE

## 2018-09-20 ENCOUNTER — NON-PROVIDER VISIT (OUTPATIENT)
Dept: CARDIOLOGY | Facility: MEDICAL CENTER | Age: 83
End: 2018-09-20
Payer: MEDICARE

## 2018-09-20 VITALS
HEIGHT: 68 IN | WEIGHT: 175.91 LBS | DIASTOLIC BLOOD PRESSURE: 64 MMHG | SYSTOLIC BLOOD PRESSURE: 102 MMHG | BODY MASS INDEX: 26.66 KG/M2

## 2018-09-20 DIAGNOSIS — I49.5 SICK SINUS SYNDROME (HCC): ICD-10-CM

## 2018-09-20 DIAGNOSIS — Z95.0 CARDIAC PACEMAKER IN SITU: Primary | ICD-10-CM

## 2018-09-20 DIAGNOSIS — Z86.79 HISTORY OF SUBDURAL HEMATOMA: ICD-10-CM

## 2018-09-20 DIAGNOSIS — E78.5 DYSLIPIDEMIA: ICD-10-CM

## 2018-09-20 PROCEDURE — 99213 OFFICE O/P EST LOW 20 MIN: CPT | Performed by: INTERNAL MEDICINE

## 2018-09-20 PROCEDURE — 93280 PM DEVICE PROGR EVAL DUAL: CPT | Performed by: INTERNAL MEDICINE

## 2018-09-20 NOTE — LETTER
Name:          Gonzales De La Cruz   YOB: 1922  Date:     09/20/2018      Willard Levin M.D.  [address]  [place] [address]     Jad Martínez MD  [address], [facility] [address]  [place] [address]  Phone: [phone]  Back Line: [phone]  Fax: [phone]  E-mail: [email or web address]   Dear Dr. Levin,    We had the pleasure of seeing your patient, Gonzales De La Cruz, in Cardiology Clinic at [facility] today.    As you know, he is a 89-year-old man with a history of sinus node dysfunction status post pacemaker implantation that we have followed him for in the past.  He is frail, hard of hearing and somewhat demented, and he has had a prior traumatic subdural hematoma.    He is doing well with no cardiovascular complaints.  I reviewed his recent labs with a creatinine of 1.3 up from previous 1.0 and otherwise mostly unremarkable.  We interrogated his pacemaker today, which she depends on being ventricularly paced 98% of the time.  Fortunately in the setting of his previous subdural hematoma he has no mode switching consistent with atrial fibrillation in the setting of his sick sinus syndrome related to which I do not agree need to concern ourselves about anticoagulation at this time.  I made no changes to his cardiovascular regimen nor ordered additional testing at this time.    He does complain a bit about pain in his buttocks and a possible pressure ulcer.  I did not examine that as his cardiologist and will defer that to the primary care setting.    Return in about 6 months (around 3/20/2019).    Thank you for the referral and please do not hesitate to contact me at any time. My contact information is listed above.    This note was dictated using Dragon speech recognition software.     A full note including my physical examination and a full list of rectified medications is available in our medical record, and can be faxed as well.    Jad Martínez MD  Cardiologist  [facility]  for [facility]

## 2018-09-26 ASSESSMENT — ENCOUNTER SYMPTOMS
CARDIOVASCULAR NEGATIVE: 1
FALLS: 1

## 2018-09-26 NOTE — PROGRESS NOTES
Subjective:   Gonzales De La Cruz is a 89 -year-old man with a history of sinus node dysfunction status post pacemaker implantation that we have followed him for in the past.  He is frail, hard of hearing and somewhat demented, and he has had a prior traumatic subdural hematoma.    He has no cardiovascular complaints today, but is concerned about a possible pressure ulcer despite padding of the seat of his wheelchair.    From the patient and his caregiver there have been no significant falls since his last visit.  He does bring in labs that I had previously requested detailed below.    His case continues to be very sad to me as I never see family present or available despite reaching out to them on multiple occasions.    Past Medical History:   Diagnosis Date   • Arrhythmia    • Arthritis    • Bradycardia     requiring pacelmaker   • Brain injury (HCC)    • Cardiac pacemaker in situ 5/8/2015   • Chronic atrial fibrillation (HCC)     rapid ventricular rate   • Constipation    • Difficulty in walking(719.7)    • Dyslipidemia 4/20/2016   • Dysphagia    • Glaucoma    • Head injury    • [place] (hard of hearing)     will not wear hearing aides   • Measles    • Pneumonia    • Subdural hematoma (HCC) 4/20/2016   • Subdural hematoma (HCC) 4/20/2016   • Symbolic dysfunction    • Vancomycin resistant enterococcus culture positive    • Weakness      Past Surgical History:   Procedure Laterality Date   • HERNIA REP HIATAL     • OTHER ABDOMINAL SURGERY       History reviewed. No pertinent family history.  History   Smoking Status   • Former Smoker   Smokeless Tobacco   • Never Used     Allergies   Allergen Reactions   • Nkda [No Known Drug Allergy]      Outpatient Encounter Prescriptions as of 9/20/2018   Medication Sig Dispense Refill   • fluticasone (FLONASE) 50 MCG/ACT nasal spray Spray 1 Spray in nose every day.     • aspirin EC (ECOTRIN) 81 MG Tablet Delayed Response Take 81 mg by mouth every day.     • sodium chloride (OCEAN)  0.65 % Solution Spray 1 Spray in nose as needed for Congestion.     • Brimonidine Tartrate-Timolol (COMBIGAN OP) by Ophthalmic route.     • hydrocodone-acetaminophen (NORCO) 5-325 MG Tab per tablet Take 1 Tab by mouth at bedtime as needed.     • omeprazole (PRILOSEC) 20 MG CPDR Take 20 mg by mouth every day.     • vitamin D (CHOLECALCIFEROL) 1000 UNIT TABS Take 2,000 Units by mouth every day.     • tamsulosin (FLOMAX) 0.4 MG capsule Take 0.4 mg by mouth ONE-HALF HOUR AFTER BREAKFAST.     • latanoprost (XALATAN) 0.005 % SOLN Place 1 Drop in both eyes every evening.     • [DISCONTINUED] Insulin Aspart (NOVOLOG SC) Inject  as instructed.     • [DISCONTINUED] insulin glargine (LANTUS) 100 UNIT/ML Solution Inject 16 Units as instructed every evening.     • [DISCONTINUED] insulin lispro (HUMALOG) 100 UNIT/ML Solution Inject  as instructed.     • [DISCONTINUED] Magnesium Hydroxide (MILK OF MAGNESIA PO) Take  by mouth.     • Fluticasone Propionate (FLONASE NA) Spray  in nose.     • bisacodyl (DULCOLAX) 10 MG Suppos Insert 10 mg in rectum as needed.     • Calcium Carbonate Antacid (TUMS E-X PO) Take  by mouth as needed.     • simethicone (MYLICON) 80 MG Chew Tab Take 80 mg by mouth every 6 hours as needed for Flatulence.     • Sodium Phosphates (FLEET) 7-19 GM/118ML Enema Insert 133 mL in rectum 1 time daily as needed.     • polyethylene glycol 3350 (MIRALAX) Powder Take 17 g by mouth every evening.     • docusate sodium (COLACE) 100 MG Cap Take 1 Cap by mouth every day.     • [DISCONTINUED] Acetaminophen 650 MG TABS Take  by mouth.       No facility-administered encounter medications on file as of 9/20/2018.      Review of Systems   Cardiovascular: Negative.    Musculoskeletal: Positive for falls and joint pain.   Neurological:        Chronic right-sided weakness as a result of prior hemorrhagic stroke   All other systems reviewed and are negative.       Objective:   /64 (BP Location: Right arm, Patient Position:  "Sitting, BP Cuff Size: Adult)   Ht 1.727 m (5' 8\")   Wt 79.8 kg (175 lb 14.6 oz)   BMI 26.75 kg/m²     Physical Exam   Constitutional: He appears well-developed and well-nourished. No distress.   Frail, elderly man not accompanied by friends or family to our visit today somewhat confused but in no distress. He again is quite hard of hearing, but today accompanied by his  from [facility].  Physical examination is unchanged from my previous note from March 2018 except as specified.   HENT:   Head: Normocephalic.   Hard of hearing. Previous left temporal scar from his traumatic subdural hematoma noted.   Eyes: Pupils are equal, round, and reactive to light. Conjunctivae and EOM are normal. No scleral icterus.   Neck: Neck supple. No JVD present. No tracheal deviation present.   Cardiovascular: Normal rate, regular rhythm, normal heart sounds and intact distal pulses.  Exam reveals no gallop and no friction rub.    No murmur heard.  Pulses:       Dorsalis pedis pulses are 2+ on the right side, and 2+ on the left side.   No carotid bruits; Pacemaker generator noted with a well-healed old scar and no surrounding erythema nor induration   Pulmonary/Chest: Effort normal and breath sounds normal. No stridor. No respiratory distress. He has no wheezes. He has no rales.   Abdominal: Soft. Bowel sounds are normal. He exhibits no distension. A hernia is present.   Musculoskeletal: He exhibits edema (trace bilateral lower extremity edema).   Neurological:   Exam deferred   Skin: Skin is warm and dry. No rash noted. He is not diaphoretic. No erythema. No pallor.   Psychiatric: He has a normal mood and affect.   Vitals reviewed.    Labs, 3/15/2017  Lipids: LDL 97, HDL 28, triglycerides 373, total cholesterol 200  CBC: WBC 7.3, hemoglobin 16.8, platelets 174  Chemistry panel: Sodium 142, potassium 4.3, chloride 102, CO2 29, BUN 23, creatinine 1.0, glucose 372, calcium 9.8  LFTs: AST 9.7, ALT 6.2, alkaline phosphatase 102, " albumin 4.3, total protein 7.5, total bilirubin 0.6    Labs, 9/19/2018  Chemistry panel: Sodium 137, potassium 4.4, chloride 1 3, CO2 27, BUN 28, creatinine 1.3, glucose 154, calcium 8.9  CBC: WBC 6.5, hemoglobin 13.0, platelets 169  Lipids: LDL 70.1, HDL 24.7, triglycerides 400, total cholesterol 175    Assessment:     1. Cardiac pacemaker in situ     2. Dyslipidemia     3. History of subdural hematoma     4. Sick sinus syndrome (HCC)         Medical Decision Making:  Today's Assessment / Status / Plan:     He is doing well with no cardiovascular complaints.  I reviewed his recent labs with a creatinine of 1.3 up from previous 1.0 and otherwise mostly unremarkable.  We interrogated his pacemaker today, which she depends on being ventricularly paced 98% of the time.  Fortunately in the setting of his previous subdural hematoma he has no mode switching consistent with atrial fibrillation in the setting of his sick sinus syndrome related to which I do not agree need to concern ourselves about anticoagulation at this time.  I made no changes to his cardiovascular regimen nor ordered additional testing at this time.    He does complain a bit about pain in his buttocks and a possible pressure ulcer.  I did not examine that as his cardiologist and will defer that to the primary care setting.    Jad Martínez MD  Cardiologist, [facility] [place]     Return in about 6 months (around 3/20/2019).    Physical Exam   Constitutional: He appears well-developed and well-nourished. No distress.   Frail, elderly man not accompanied by friends or family to our visit today somewhat confused but in no distress. He again is quite hard of hearing, but today accompanied by his  from [employer].  Physical examination is unchanged from my previous note from March 2018 except as specified.   HENT:   Head: Normocephalic.   Hard of hearing. Previous left temporal scar from his traumatic subdural hematoma noted.   Eyes:  Pupils are equal, round, and reactive to light. Conjunctivae and EOM are normal. No scleral icterus.   Neck: Neck supple. No JVD present. No tracheal deviation present.   Cardiovascular: Normal rate, regular rhythm, normal heart sounds and intact distal pulses.  Exam reveals no gallop and no friction rub.    No murmur heard.  Pulses:       Dorsalis pedis pulses are 2+ on the right side, and 2+ on the left side.   No carotid bruits; Pacemaker generator noted with a well-healed old scar and no surrounding erythema nor induration   Pulmonary/Chest: Effort normal and breath sounds normal. No stridor. No respiratory distress. He has no wheezes. He has no rales.   Abdominal: Soft. Bowel sounds are normal. He exhibits no distension. A hernia is present.   Musculoskeletal: He exhibits edema (trace bilateral lower extremity edema).   Neurological:   Exam deferred   Skin: Skin is warm and dry. No rash noted. He is not diaphoretic. No erythema. No pallor.   Psychiatric: He has a normal mood and affect.   Vitals reviewed.

## 2019-01-01 ENCOUNTER — NON-PROVIDER VISIT (OUTPATIENT)
Dept: CARDIOLOGY | Facility: MEDICAL CENTER | Age: 84
End: 2019-01-01
Payer: MEDICARE

## 2019-01-01 ENCOUNTER — TELEPHONE (OUTPATIENT)
Dept: CARDIOLOGY | Facility: MEDICAL CENTER | Age: 84
End: 2019-01-01

## 2019-01-01 ENCOUNTER — OFFICE VISIT (OUTPATIENT)
Dept: CARDIOLOGY | Facility: MEDICAL CENTER | Age: 84
End: 2019-01-01
Payer: MEDICARE

## 2019-01-01 VITALS
DIASTOLIC BLOOD PRESSURE: 68 MMHG | BODY MASS INDEX: 29.55 KG/M2 | HEIGHT: 68 IN | OXYGEN SATURATION: 91 % | HEART RATE: 70 BPM | SYSTOLIC BLOOD PRESSURE: 120 MMHG | WEIGHT: 195 LBS

## 2019-01-01 VITALS
HEART RATE: 70 BPM | SYSTOLIC BLOOD PRESSURE: 120 MMHG | HEIGHT: 68 IN | OXYGEN SATURATION: 91 % | BODY MASS INDEX: 29.55 KG/M2 | WEIGHT: 195 LBS | DIASTOLIC BLOOD PRESSURE: 68 MMHG

## 2019-01-01 VITALS
HEART RATE: 58 BPM | BODY MASS INDEX: 26.52 KG/M2 | OXYGEN SATURATION: 96 % | HEIGHT: 68 IN | DIASTOLIC BLOOD PRESSURE: 60 MMHG | WEIGHT: 175 LBS | SYSTOLIC BLOOD PRESSURE: 92 MMHG

## 2019-01-01 DIAGNOSIS — F03.90 DEMENTIA WITHOUT BEHAVIORAL DISTURBANCE, UNSPECIFIED DEMENTIA TYPE: ICD-10-CM

## 2019-01-01 DIAGNOSIS — I49.5 SICK SINUS SYNDROME (HCC): ICD-10-CM

## 2019-01-01 DIAGNOSIS — Z95.0 CARDIAC PACEMAKER IN SITU: ICD-10-CM

## 2019-01-01 DIAGNOSIS — E78.5 DYSLIPIDEMIA: ICD-10-CM

## 2019-01-01 DIAGNOSIS — Z86.79 HISTORY OF SUBDURAL HEMATOMA: ICD-10-CM

## 2019-01-01 PROCEDURE — 99213 OFFICE O/P EST LOW 20 MIN: CPT | Mod: 25 | Performed by: INTERNAL MEDICINE

## 2019-01-01 PROCEDURE — 93280 PM DEVICE PROGR EVAL DUAL: CPT | Performed by: NURSE PRACTITIONER

## 2019-01-01 PROCEDURE — 93280 PM DEVICE PROGR EVAL DUAL: CPT | Performed by: INTERNAL MEDICINE

## 2019-01-01 ASSESSMENT — ENCOUNTER SYMPTOMS
SPUTUM PRODUCTION: 0
COUGH: 1
SHORTNESS OF BREATH: 0
FALLS: 1
CARDIOVASCULAR NEGATIVE: 1

## 2019-03-05 NOTE — LETTER
Name:          Gonzales De La Cruz   YOB: 1922  Date:     03/05/2019      Willard Levin M.D.  [address]  [place] [address]     Jad Martínez MD  [address], [facility] [address]  [place] [address]  Phone: [phone]  Back Line: [phone]  Fax: [phone]  E-mail: [email or web address]   Dear Dr. Levin,    We had the pleasure of seeing your patient, Gonzales De La Cruz, in Cardiology Clinic at [facility] today.    As you know, he is a 89-year-old man with a history of sinus node dysfunction status post pacemaker implantation that we have followed him for in the past.  He is frail, hard of hearing and somewhat demented, and he has had a prior traumatic subdural hematoma.    He has no cardiovascular complaints today, and his blood pressure continues to be low off of antihypertensive medications.  His pacemaker is functioning well, and I have made no changes to his medical regimen.  I did order some routine labs including a CBC, chemistry panel, and lipids.    Return in about 6 months (around 9/5/2019).    Thank you for the referral and please do not hesitate to contact me at any time. My contact information is listed above.    This note was dictated using Dragon speech recognition software.     A full note including my physical examination and a full list of rectified medications is available in our medical record, and can be faxed as well.    Jad Martínez MD  Cardiologist  [facility]

## 2019-11-26 PROBLEM — F03.90 DEMENTIA (HCC): Status: ACTIVE | Noted: 2019-01-01

## 2019-11-26 NOTE — PROGRESS NOTES
Device is working normally. 17 mode switching episodes (<0.1% of total time, longest episode 1+ minute).  Normal sensing of RA lead; unable to measure R waves. Stable capture of RA and RV leads; stable impedances. Battery longevity is 3.5 years.  No changes are made today.    FU in 12 months for next PM check with me.    Collaborating MD: Gifty

## 2019-11-26 NOTE — PROGRESS NOTES
Cardiology Note    Chief Complaint   Patient presents with   • Atrial Fibrillation       History of Present Illness: Gonzales De La Cruz is a 89 y.o. male PMH SSS s/p PPM, HLD, subdural hemorrhage who presents for follow up.    No changes since last visit. Aid at bedside states this is his usual mental state and physical health. Patient with mild dementia and unable to fully participate in interview. Denies chest pain and trouble breathing while on oxygen.    Review of Systems   Cardiovascular: Negative for chest pain.   Respiratory: Negative for shortness of breath.          Past Medical History:   Diagnosis Date   • Arrhythmia    • Arthritis    • Bradycardia     requiring pacelmaker   • Brain injury (HCC)    • Cardiac pacemaker in situ 5/8/2015   • Chronic atrial fibrillation     rapid ventricular rate   • Constipation    • Difficulty in walking(719.7)    • Dyslipidemia 4/20/2016   • Dysphagia    • Glaucoma    • Head injury    • [place] (hard of hearing)     will not wear hearing aides   • Measles    • Pneumonia    • Subdural hematoma (HCC) 4/20/2016   • Subdural hematoma (HCC) 4/20/2016   • Symbolic dysfunction    • Vancomycin resistant enterococcus culture positive    • Weakness          Past Surgical History:   Procedure Laterality Date   • HERNIA REP HIATAL     • OTHER ABDOMINAL SURGERY           Current Outpatient Medications   Medication Sig Dispense Refill   • Melatonin 3 MG Cap Take  by mouth.     • aspirin EC (ECOTRIN) 81 MG Tablet Delayed Response Take 81 mg by mouth every day.     • Brimonidine Tartrate-Timolol (COMBIGAN OP) by Ophthalmic route.     • polyethylene glycol 3350 (MIRALAX) Powder Take 17 g by mouth every evening.     • docusate sodium (COLACE) 100 MG Cap Take 1 Cap by mouth every day.     • omeprazole (PRILOSEC) 20 MG CPDR Take 20 mg by mouth every day.     • vitamin D (CHOLECALCIFEROL) 1000 UNIT TABS Take 2,000 Units by mouth every day.       No current facility-administered medications for  "this visit.          Allergies   Allergen Reactions   • Nkda [No Known Drug Allergy]          History reviewed. No pertinent family history.      Social History     Socioeconomic History   • Marital status:      Spouse name: Not on file   • Number of children: Not on file   • Years of education: Not on file   • Highest education level: Not on file   Occupational History   • Not on file   Social Needs   • Financial resource strain: Not on file   • Food insecurity:     Worry: Not on file     Inability: Not on file   • Transportation needs:     Medical: Not on file     Non-medical: Not on file   Tobacco Use   • Smoking status: Former Smoker   • Smokeless tobacco: Never Used   Substance and Sexual Activity   • Alcohol use: No   • Drug use: No   • Sexual activity: Not on file   Lifestyle   • Physical activity:     Days per week: Not on file     Minutes per session: Not on file   • Stress: Not on file   Relationships   • Social connections:     Talks on phone: Not on file     Gets together: Not on file     Attends L[employer]-day service: Not on file     Active member of club or organization: Not on file     Attends meetings of clubs or organizations: Not on file     Relationship status: Not on file   • Intimate partner violence:     Fear of current or ex partner: Not on file     Emotionally abused: Not on file     Physically abused: Not on file     Forced sexual activity: Not on file   Other Topics Concern   • Not on file   Social History Narrative   • Not on file         Physical Exam:  Ambulatory Vitals  /68 (BP Location: Left arm, Patient Position: Sitting, BP Cuff Size: Adult)   Pulse 70   Ht 1.727 m (5' 8\")   Wt 88.5 kg (195 lb)   SpO2 91%    BP Readings from Last 4 Encounters:   11/26/19 120/68   11/26/19 120/68   03/05/19 (!) 92/60   09/20/18 102/64     Weight/BMI:   Vitals:    11/26/19 1446   BP: 120/68   Weight: 88.5 kg (195 lb)   Height: 1.727 m (5' 8\")    Body mass index is 29.65 kg/m².  Wt Readings " from Last 4 Encounters:   11/26/19 88.5 kg (195 lb)   11/26/19 88.5 kg (195 lb)   03/05/19 79.4 kg (175 lb)   09/20/18 79.8 kg (175 lb 14.6 oz)       Physical Exam   Constitutional: He is oriented to person, place, and time and well-developed, well-nourished, and in no distress. No distress.   HENT:   Head: Normocephalic and atraumatic.   Eyes: Pupils are equal, round, and reactive to light. Conjunctivae are normal.   Neck: Normal range of motion. Neck supple. No JVD present.   Cardiovascular: Normal rate, regular rhythm, normal heart sounds and intact distal pulses. Exam reveals no gallop and no friction rub.   No murmur heard.  Pulmonary/Chest: Effort normal and breath sounds normal. No respiratory distress. He has no wheezes. He has no rales. He exhibits no tenderness.   Abdominal: Soft. Bowel sounds are normal. He exhibits no distension.   Musculoskeletal:         General: No edema.   Neurological: He is alert and oriented to person, place, and time.   Skin: Skin is warm and dry.   Psychiatric: Affect and judgment normal.       Lab Data Review:  No results found for: CHOLSTRLTOT, LDL, HDL, TRIGLYCERIDE    Lab Results   Component Value Date/Time    SODIUM 141 07/21/2009 01:45 AM    POTASSIUM 4.2 07/21/2009 01:45 AM    CHLORIDE 107 07/21/2009 01:45 AM    CO2 26 07/21/2009 01:45 AM    GLUCOSE 132 (H) 07/21/2009 01:45 AM    BUN 17 07/21/2009 01:45 AM    CREATININE 1.16 07/21/2009 01:45 AM    CREATININE 0.9 06/24/2007 02:10 AM     CrCl cannot be calculated (Patient's most recent lab result is older than the maximum 7 days allowed.).  Lab Results   Component Value Date/Time    ALKPHOSPHAT 62 07/20/2009 03:57 AM    ASTSGOT 23 07/20/2009 03:57 AM    ALTSGPT 21 07/20/2009 03:57 AM    TBILIRUBIN 0.6 07/20/2009 03:57 AM      Lab Results   Component Value Date/Time    WBC 7.3 07/21/2009 01:45 AM     No results found for: HBA1C  No components found for: TROP      Cardiac Imaging and Procedures Review:    TTE  2009  CONCLUSIONS  Mildly increased septal wall thickness.  Mildly increased posterior wall thickness.  Mild concentric left ventricular hypertrophy.  Left ventricular ejection fraction is 60% to 65%.  Grade 1 diastolic dysfunction.  Enlarged right ventricular size.  Mild tricuspid regurgitation. No pulmonary hypertension.  Mildaortic insufficiency.    Medical Decision Making:  Problem List Items Addressed This Visit     Cardiac pacemaker in situ     Annual follow up for device check.          Dyslipidemia     No clear evidence in this age group to continue aggressive cholesterol control.         Sick sinus syndrome (HCC)     Stable with pacemaker.               It was my pleasure to meet with Mr. De La Cruz.

## 2021-01-14 DIAGNOSIS — Z23 NEED FOR VACCINATION: ICD-10-CM

## 2021-09-29 NOTE — TELEPHONE ENCOUNTER
Called patient in regards to standing lab order. No reply, left voicemail to call back.    
29-Sep-2021